# Patient Record
Sex: MALE | Race: WHITE | Employment: FULL TIME | URBAN - METROPOLITAN AREA
[De-identification: names, ages, dates, MRNs, and addresses within clinical notes are randomized per-mention and may not be internally consistent; named-entity substitution may affect disease eponyms.]

---

## 2017-01-18 DIAGNOSIS — G63 GOUTY NEURITIS (H): Primary | ICD-10-CM

## 2017-01-18 DIAGNOSIS — M10.00 GOUTY NEURITIS (H): Primary | ICD-10-CM

## 2017-01-18 RX ORDER — ALLOPURINOL 100 MG/1
TABLET ORAL
Qty: 60 TABLET | Refills: 0 | OUTPATIENT
Start: 2017-01-18

## 2017-01-18 NOTE — TELEPHONE ENCOUNTER
2 jesse refills have already been given, he needs an appointment before meds will be refilled.  Ok to route back to me if he schedules an appt and I'll give him enough to last until that appt  Anne-Marie Alexander PA-C

## 2017-01-18 NOTE — Clinical Note
[facility]  [address]  [facility] [address]  [phone]          Tyson Skinner  [address]  [facility] [address]          01/19/2017      Dear Tyson Skinner        At [facility] we care about your health and are committed to providing quality patient care. Regular appointments are a vital part of the care and management of your health and can help prevent many of the complications that can occur.      It has come to our attention, after 2 jesse refills, that you are due for an office visit in order to process another refill request. Please call [facility] at [phone] soon to schedule your appointment.    If you have transferred care to another clinic please call to inform us so that we do not continue to send you reminder letters.      Sincerely,      [facility] Care Team

## 2017-01-18 NOTE — TELEPHONE ENCOUNTER
allopurinol (ZYLOPRIM) 100 MG tablet       Last Written Prescription Date: 12/07/16  Last Fill Quantity: 60, # refills: 0  Last Office Visit with G, P or [facility] prescribing provider:  02/17/16        URIC ACID   Date Value Ref Range Status   02/17/2016 7.8* 3.5 - 7.2 mg/dL Final   ]  CREATININE   Date Value Ref Range Status   02/17/2016 1.02 0.66 - 1.25 mg/dL Final   ]  WBC      8.4   7/9/2014  RBC     5.21   7/9/2014  HGB     15.6   7/9/2014  HCT     46.0   7/9/2014  No components found with this name: mct  MCV       88   7/9/2014  MCH     29.9   7/9/2014  MCHC     33.9   7/9/2014  RDW     14.1   7/9/2014  PLT      287   7/9/2014  AST       17   2/17/2016  ALT       31   2/17/2016      [facility]

## 2017-01-18 NOTE — TELEPHONE ENCOUNTER
Routing refill request to provider for review/approval because:  Labs out of range:  Uric Acid    Nereida Geiger RN

## 2017-01-19 NOTE — TELEPHONE ENCOUNTER
Called and left a voicemail message and sent a letter  That the provider is unable to refill med and that an   appointment is needed.  Liudmila Guan MA/  For [facility]

## 2017-01-27 ENCOUNTER — OFFICE VISIT (OUTPATIENT)
Dept: FAMILY MEDICINE | Facility: CLINIC | Age: 52
End: 2017-01-27
Payer: COMMERCIAL

## 2017-01-27 VITALS
TEMPERATURE: 97.7 F | BODY MASS INDEX: 27.89 KG/M2 | DIASTOLIC BLOOD PRESSURE: 88 MMHG | HEIGHT: 68 IN | OXYGEN SATURATION: 98 % | WEIGHT: 184 LBS | HEART RATE: 91 BPM | SYSTOLIC BLOOD PRESSURE: 135 MMHG

## 2017-01-27 DIAGNOSIS — B00.1 COLD SORE: ICD-10-CM

## 2017-01-27 DIAGNOSIS — M10.9 GOUT, UNSPECIFIED: Primary | ICD-10-CM

## 2017-01-27 LAB
ALBUMIN SERPL-MCNC: 4.4 G/DL (ref 3.4–5)
ALP SERPL-CCNC: 65 U/L (ref 40–150)
ALT SERPL W P-5'-P-CCNC: 32 U/L (ref 0–70)
ANION GAP SERPL CALCULATED.3IONS-SCNC: 8 MMOL/L (ref 3–14)
AST SERPL W P-5'-P-CCNC: 16 U/L (ref 0–45)
BILIRUB SERPL-MCNC: 1.2 MG/DL (ref 0.2–1.3)
BUN SERPL-MCNC: 14 MG/DL (ref 7–30)
CALCIUM SERPL-MCNC: 9.1 MG/DL (ref 8.5–10.1)
CHLORIDE SERPL-SCNC: 106 MMOL/L (ref 94–109)
CO2 SERPL-SCNC: 26 MMOL/L (ref 20–32)
CREAT SERPL-MCNC: 0.98 MG/DL (ref 0.66–1.25)
GFR SERPL CREATININE-BSD FRML MDRD: 80 ML/MIN/1.7M2
GLUCOSE SERPL-MCNC: 94 MG/DL (ref 70–99)
POTASSIUM SERPL-SCNC: 3.9 MMOL/L (ref 3.4–5.3)
PROT SERPL-MCNC: 7.7 G/DL (ref 6.8–8.8)
SODIUM SERPL-SCNC: 140 MMOL/L (ref 133–144)

## 2017-01-27 PROCEDURE — 99214 OFFICE O/P EST MOD 30 MIN: CPT | Performed by: FAMILY MEDICINE

## 2017-01-27 PROCEDURE — 80053 COMPREHEN METABOLIC PANEL: CPT | Performed by: FAMILY MEDICINE

## 2017-01-27 PROCEDURE — 36415 COLL VENOUS BLD VENIPUNCTURE: CPT | Performed by: FAMILY MEDICINE

## 2017-01-27 RX ORDER — ALLOPURINOL 100 MG/1
TABLET ORAL
Qty: 180 TABLET | Refills: 3 | Status: SHIPPED | OUTPATIENT
Start: 2017-01-27 | End: 2018-04-26

## 2017-01-27 RX ORDER — INDOMETHACIN 25 MG/1
CAPSULE ORAL
Qty: 40 CAPSULE | Refills: 3 | Status: SHIPPED | OUTPATIENT
Start: 2017-01-27 | End: 2018-02-09

## 2017-01-27 RX ORDER — VALACYCLOVIR HYDROCHLORIDE 1 G/1
2000 TABLET, FILM COATED ORAL 2 TIMES DAILY
Qty: 12 TABLET | Refills: 3 | Status: SHIPPED | OUTPATIENT
Start: 2017-01-27 | End: 2018-01-31

## 2017-01-27 ASSESSMENT — PAIN SCALES - GENERAL: PAINLEVEL: NO PAIN (0)

## 2017-01-27 NOTE — NURSING NOTE
"Chief Complaint   Patient presents with     Recheck Medication     refill gout medications       Initial /88 mmHg  Pulse 91  Temp(Src) 97.7  F (36.5  C) (Oral)  Ht 5' 8.25\" (1.734 m)  Wt 184 lb (83.462 kg)  BMI 27.76 kg/m2  SpO2 98% Estimated body mass index is 27.76 kg/(m^2) as calculated from the following:    Height as of this encounter: 5' 8.25\" (1.734 m).    Weight as of this encounter: 184 lb (83.462 kg).  BP completed using cuff size: trae Padron MA      "

## 2017-01-27 NOTE — Clinical Note
[address] [address]  [phone]             January 30, 2017    Tyson Skinner  [address] [facility] [address]            Dear Tyson,    Your kidney, liver and electrolyte tests were normal for you. Enclosed are the results.  Results for orders placed or performed in visit on 01/27/17   Comprehensive metabolic panel (BMP + Alb, Alk Phos, ALT, AST, Total. Bili, TP)   Result Value Ref Range    Sodium 140 133 - 144 mmol/L    Potassium 3.9 3.4 - 5.3 mmol/L    Chloride 106 94 - 109 mmol/L    Carbon Dioxide 26 20 - 32 mmol/L    Anion Gap 8 3 - 14 mmol/L    Glucose 94 70 - 99 mg/dL    Urea Nitrogen 14 7 - 30 mg/dL    Creatinine 0.98 0.66 - 1.25 mg/dL    GFR Estimate 80 >60 mL/min/1.7m2    GFR Estimate If Black >90   GFR Calc   >60 mL/min/1.7m2    Calcium 9.1 8.5 - 10.1 mg/dL    Bilirubin Total 1.2 0.2 - 1.3 mg/dL    Albumin 4.4 3.4 - 5.0 g/dL    Protein Total 7.7 6.8 - 8.8 g/dL    Alkaline Phosphatase 65 40 - 150 U/L    ALT 32 0 - 70 U/L    AST 16 0 - 45 U/L       Sincerely,  Gustabo Au MD/tej

## 2017-01-27 NOTE — MR AVS SNAPSHOT
After Visit Summary   1/27/2017    Tyson Skinner    MRN: [id number]           Patient Information     Date Of Birth          1965        Visit Information        Provider Department      1/27/2017 11:00 AM Gustabo Au MD [facility]        Today's Diagnoses     Gout, unspecified    -  1     Cold sore           Care Instructions    How to contact your care team providers:    [facility]  [phone]  Pharmacy [phone]    MANUELA Espinoza Dr., Dr., Dr., PA-C    Team RN: Ryan DAHL and Veronica DAVIDSON    Clinic hours  M-Th 7am-7pm   Fri 7am-5pm.   Urgent care M-F 11am-9pm,   Sat/sun 9am-5pm.  Pharmacy M-F 8:00am-8pm Sat/sun 9am-5pm.     All password changes, disabled accounts, or ID changes in [employer]/MyHealth will be done by our Access Services Department.   If you need help with your account or password, call: [phone]. Clinic staff no longer has the ability to change passwords.                       Follow-ups after your visit        Who to contact     If you have questions or need follow up information about today's clinic visit or your schedule please contact [facility] directly at [phone].  Normal or non-critical lab and imaging results will be communicated to you by RevTrax[employer], letter or phone within 4 business days after the clinic has received the results. If you do not hear from us within 7 days, please contact the clinic through [employer] or phone. If you have a critical or abnormal lab result, we will notify you by phone as soon as possible.  Submit refill requests through auctionpoint or call your pharmacy and they will forward the refill request to us. Please allow 3 business days for your refill to be completed.          Additional Information About Your Visit        RevTraxhart Information     auctionpoint lets you send messages to your doctor, view your test  "results, renew your prescriptions, schedule appointments and more. To sign up, go to [email or web address] . Click on \"Log in\" on the left side of the screen, which will take you to the Welcome page. Then click on \"Sign up Now\" on the right side of the page.     You will be asked to enter the access code listed below, as well as some personal information. Please follow the directions to create your username and password.     Your access code is: [email or web address]  Expires: 2017 11:11 AM     Your access code will  in 90 days. If you need help or a new code, please call your [facility] clinic or [phone].        Care EveryWhere ID     This is your Care EveryWhere ID. This could be used by other organizations to access your [facility] medical records  [id number]        Your Vitals Were     Pulse Temperature Height BMI (Body Mass Index) Pulse Oximetry       91 97.7  F (36.5  C) (Oral) 5' 8.25\" (1.734 m) 27.76 kg/m2 98%        Blood Pressure from Last 3 Encounters:   17 135/88   16 130/96   10/29/14 108/77    Weight from Last 3 Encounters:   17 184 lb (83.462 kg)   16 194 lb 8 oz (88.225 kg)   10/22/14 190 lb (86.183 kg)              We Performed the Following     Comprehensive metabolic panel (BMP + Alb, Alk Phos, ALT, AST, Total. Bili, TP)          Today's Medication Changes          These changes are accurate as of: 17 11:11 AM.  If you have any questions, ask your nurse or doctor.               These medicines have changed or have updated prescriptions.        Dose/Directions    allopurinol 100 MG tablet   Commonly known as:  ZYLOPRIM   This may have changed:  See the new instructions.   Used for:  Gout, unspecified   Changed by:  Gustabo Au MD        Take 2 tablets by mouth daily for gout prevention.   Quantity:  180 tablet   Refills:  3       valACYclovir 1000 mg tablet   Commonly known as:  VALTREX   This may have changed:  additional instructions   Used for:  Cold sore "   Changed by:  Gustabo Au MD        Dose:  2000 mg   Take 2 tablets (2,000 mg) by mouth 2 times daily For 1 day as needed for flares.   Quantity:  12 tablet   Refills:  3            Where to get your medicines      These medications were sent to [id number] - [place], [place] - [address]  [address] [facility] [place] [address]     Phone:  [phone]    - allopurinol 100 MG tablet  - indomethacin 25 MG capsule  - valACYclovir 1000 mg tablet             Primary Care Provider    Physician No Ref-Primary       No address on file        Thank you!     Thank you for choosing [facility]  for your care. Our goal is always to provide you with excellent care. Hearing back from our patients is one way we can continue to improve our services. Please take a few minutes to complete the written survey that you may receive in the mail after your visit with us. Thank you!             Your Updated Medication List - Protect others around you: Learn how to safely use, store and throw away your medicines at www.disposemymeds.org.          This list is accurate as of: 1/27/17 11:11 AM.  Always use your most recent med list.                   Brand Name Dispense Instructions for use    allopurinol 100 MG tablet    ZYLOPRIM    180 tablet    Take 2 tablets by mouth daily for gout prevention.       IBUPROFEN PO          indomethacin 25 MG capsule    INDOCIN    40 capsule    TAKE 1 CAPSULE (25 MG) BY MOUTH 3 TIMES DAILY WITH MEALS AS NEEDED FOR GOUT PAIN       valACYclovir 1000 mg tablet    VALTREX    12 tablet    Take 2 tablets (2,000 mg) by mouth 2 times daily For 1 day as needed for flares.

## 2017-01-27 NOTE — PROGRESS NOTES
"  SUBJECTIVE:                                                    Tyson Skinner is a 51 year old male who presents to clinic today for the following health issues:    Medication Followup of indomethacin, allopurinol    Taking Medication as prescribed: yes    Side Effects:  None    Medication Helping Symptoms:  yes     Out of medication for 2 weeks. No pain. Last flare over a year ago.    Problem list and histories reviewed & adjusted, as indicated.  Additional history: as documented    Problem list, Medication list, Allergies, and Medical/Social/Surgical histories reviewed in EPIC and updated as appropriate.    ROS:  Constitutional, HEENT, cardiovascular, pulmonary, GI, , musculoskeletal, neuro, skin, endocrine and psych systems are negative, except as otherwise noted.    OBJECTIVE:                                                    /88 mmHg  Pulse 91  Temp(Src) 97.7  F (36.5  C) (Oral)  Ht 5' 8.25\" (1.734 m)  Wt 184 lb (83.462 kg)  BMI 27.76 kg/m2  SpO2 98%  Body mass index is 27.76 kg/(m^2).  GENERAL: healthy, alert and no distress  NECK: no adenopathy, no asymmetry, masses, or scars and thyroid normal to palpation  RESP: lungs clear to auscultation - no rales, rhonchi or wheezes  CV: regular rate and rhythm, normal S1 S2, no S3 or S4, no murmur, click or rub, no peripheral edema and peripheral pulses strong  ABDOMEN: soft, nontender, no hepatosplenomegaly, no masses and bowel sounds normal  MS: no gross musculoskeletal defects noted, no edema    Diagnostic Test Results:  none      ASSESSMENT/PLAN:                                                      1. Gout, unspecified  Controlled. rxs refilled. Monitor renal and hepatic function. Uric acid likely elevated since off allopurinol. Recheck in 1 year while on allopurinol.  - allopurinol (ZYLOPRIM) 100 MG tablet; Take 2 tablets by mouth daily for gout prevention.  Dispense: 180 tablet; Refill: 3  - indomethacin (INDOCIN) 25 MG capsule; TAKE 1 CAPSULE " (25 MG) BY MOUTH 3 TIMES DAILY WITH MEALS AS NEEDED FOR GOUT PAIN  Dispense: 40 capsule; Refill: 3  - Comprehensive metabolic panel (BMP + Alb, Alk Phos, ALT, AST, Total. Bili, TP)    2. Cold sore  Needed refills to use as needed.  - valACYclovir (VALTREX) 1000 mg tablet; Take 2 tablets (2,000 mg) by mouth 2 times daily For 1 day as needed for flares.  Dispense: 12 tablet; Refill: 3    See Patient Instructions    Gustabo Au MD, MD  [facility]

## 2017-01-27 NOTE — PATIENT INSTRUCTIONS
How to contact your care team providers:    [facility]  [phone]  Pharmacy [phone]    MANUELA Espinoza Dr., Dr., Dr., PA-C    Team RN: Ryan DAVIDSON    Clinic hours  M-Th 7am-7pm   Fri 7am-5pm.   Urgent care M-F 11am-9pm,   Sat/sun 9am-5pm.  Pharmacy M-F 8:00am-8pm Sat/sun 9am-5pm.     All password changes, disabled accounts, or ID changes in [employer]/MyHealth will be done by our Access Services Department.   If you need help with your account or password, call: [phone]. Clinic staff no longer has the ability to change passwords.

## 2017-01-30 NOTE — PROGRESS NOTES
Quick Note:    Letter sent to patients home address with results.  Nahid Bullock,  For [facility]   ______

## 2018-01-31 DIAGNOSIS — B00.1 COLD SORE: ICD-10-CM

## 2018-01-31 NOTE — TELEPHONE ENCOUNTER
"Requested Prescriptions   Pending Prescriptions Disp Refills     valACYclovir (VALTREX) 1000 mg tablet [Pharmacy Med Name: ValACYclovir HCl Oral Tablet 1 GM]    Last Written Prescription Date:  1/27/17  Last Fill Quantity: 12,  # refills: 3   Last Office Visit with G, P or [facility] prescribing provider:  1/27/17   Future Office Visit:      12 tablet 2     Sig: TAKE TWO TABLETS BY MOUTH TWICE DAILY FOR 1 DAY AS NEEDED FOR FLARES    Antivirals for Herpes Protocol Failed    1/31/2018  7:01 AM       Failed - Recent or future visit with authorizing provider's specialty    Patient had office visit in the last year or has a visit in the next 30 days with authorizing provider.  See \"Patient Info\" tab in inbasket, or \"Choose Columns\" in Meds & Orders section of the refill encounter.            Failed - Normal serum creatinine on file in past 12 months    Recent Labs   Lab Test  01/27/17   1123   CR  0.98            Passed - Patient is age 12 or older              Freedom [facility]  "

## 2018-02-05 RX ORDER — VALACYCLOVIR HYDROCHLORIDE 1 G/1
TABLET, FILM COATED ORAL
Qty: 12 TABLET | Refills: 2 | Status: SHIPPED | OUTPATIENT
Start: 2018-02-05

## 2018-02-09 DIAGNOSIS — M10.9 GOUT, UNSPECIFIED CAUSE, UNSPECIFIED CHRONICITY, UNSPECIFIED SITE: ICD-10-CM

## 2018-02-09 NOTE — TELEPHONE ENCOUNTER
"Requested Prescriptions   Pending Prescriptions Disp Refills     indomethacin (INDOCIN) 25 MG capsule [Pharmacy Med Name: Indomethacin Oral Capsule 25 MG]  Last Written Prescription Date:  01/27/17  Last Fill Quantity: 40,  # refills: 3   Last Office Visit with [facility], [facility] or [facility] prescribing provider:  01/27/17   Future Office Visit:    40 capsule 2     Sig: take 1 capsule by mouth 3 times per day with meals as needed for gout pain    Gout Agents Protocol Failed    2/9/2018  7:02 AM       Failed - CBC on file in past 12 months    Recent Labs   Lab Test  07/09/14   1046   WBC  8.4   RBC  5.21   HGB  15.6   HCT  46.0   PLT  287            Failed - ALT on file in past 12 months    Recent Labs   Lab Test  01/27/17   1123   ALT  32            Failed - Uric acid greater than or equal to 6 on file in past 12 months    Recent Labs   Lab Test  02/17/16   0849   URIC  7.8*     If level is 6mg/dL or greater, ok to refill one time and refer to provider.          Failed - Recent or future visit with authorizing provider's specialty    Patient had office visit in the last year or has a visit in the next 30 days with authorizing provider.  See \"Patient Info\" tab in inbasket, or \"Choose Columns\" in Meds & Orders section of the refill encounter.            Failed - Normal serum creatinine on file in the past 12 months    Recent Labs   Lab Test  01/27/17   1123   CR  0.98            Passed - Patient is age 18 or older          "

## 2018-02-14 RX ORDER — INDOMETHACIN 25 MG/1
CAPSULE ORAL
Qty: 40 CAPSULE | Refills: 2 | Status: SHIPPED | OUTPATIENT
Start: 2018-02-14

## 2018-04-26 DIAGNOSIS — M1A.9XX0 CHRONIC GOUT WITHOUT TOPHUS, UNSPECIFIED CAUSE, UNSPECIFIED SITE: ICD-10-CM

## 2018-04-26 NOTE — TELEPHONE ENCOUNTER
Rx sent with refills, should have refills:    allopurinol (ZYLOPRIM) 100 MG tablet 180 tablet 3 1/27/2017

## 2018-04-27 RX ORDER — ALLOPURINOL 100 MG/1
TABLET ORAL
Qty: 180 TABLET | Refills: 0 | Status: SHIPPED | OUTPATIENT
Start: 2018-04-27 | End: 2018-10-20

## 2018-04-27 NOTE — TELEPHONE ENCOUNTER
"Routing refill request to provider for review/approval because:  Labs not current:  CBC, ALT, Uric Acid, Creat  Patient needs to be seen because it has been more than 1 year since last office visit.    Felisha Borrego RN        Requested Prescriptions   Pending Prescriptions Disp Refills     allopurinol (ZYLOPRIM) 100 MG tablet [Pharmacy Med Name: Allopurinol Oral Tablet 100 MG] 180 tablet 2     Sig: TAKE TWO TABLETS BY MOUTH DAILY for gout prevention    Gout Agents Protocol Failed    4/26/2018  8:54 AM       Failed - CBC on file in past 12 months    Recent Labs   Lab Test  07/09/14   1046   WBC  8.4   RBC  5.21   HGB  15.6   HCT  46.0   PLT  287            Failed - ALT on file in past 12 months    Recent Labs   Lab Test  01/27/17   1123   ALT  32            Failed - Uric acid greater than or equal to 6 on file in past 12 months    Recent Labs   Lab Test  02/17/16   0849   URIC  7.8*     If level is 6mg/dL or greater, ok to refill one time and refer to provider.          Failed - Recent (12 mo) or future (30 days) visit within the authorizing provider's specialty    Patient had office visit in the last 12 months or has a visit in the next 30 days with authorizing provider or within the authorizing provider's specialty.  See \"Patient Info\" tab in inbasket, or \"Choose Columns\" in Meds & Orders section of the refill encounter.           Failed - Normal serum creatinine on file in the past 12 months    Recent Labs   Lab Test  01/27/17   1123   CR  0.98            Passed - Patient is age 18 or older          "

## 2018-10-20 DIAGNOSIS — M1A.9XX0 CHRONIC GOUT WITHOUT TOPHUS, UNSPECIFIED CAUSE, UNSPECIFIED SITE: ICD-10-CM

## 2018-10-23 NOTE — TELEPHONE ENCOUNTER
Routing refill request to provider for review/approval because:  Labs not current: all  A break in medication  Patient needs to be seen because it has been more than 1 year since last office visit. - do you want staff to contact patient to schedule?  Veronica Davison RN

## 2018-10-24 RX ORDER — ALLOPURINOL 100 MG/1
200 TABLET ORAL DAILY
Qty: 180 TABLET | Refills: 0 | Status: SHIPPED | OUTPATIENT
Start: 2018-10-24 | End: 2019-03-21

## 2019-01-08 ENCOUNTER — TELEPHONE (OUTPATIENT)
Dept: FAMILY MEDICINE | Facility: CLINIC | Age: 54
End: 2019-01-08

## 2019-01-08 NOTE — TELEPHONE ENCOUNTER
"Tyson Skinner is a 53 year old male who calls with tight feeling in chest, winded with activity.    NURSING ASSESSMENT:  Description:  Patient has been noticing fatigue and being winded with exertion, even after one flight of stairs. Slight chest tightness and \"sore\"  feeling in mid chest. Has had for a \"few months\" now. Biggest complaint is feeling tired and exhausted all the time, particularly after activity.  Onset/duration:  \"few months\"  Precip. factors:  none  Associated symptoms:  See above. Denies SOB, cough, dizziness or lightheadedness, n/v, tightness radiating to any other parts of body, denies chest pain, profuse sweating, heart palpitations. Does not have any noted cardiac history, no meds, no diabetes, denies smoking, no HTN. Tightness is not affected by taking deep breaths. No asthma history, does have seasonal allergies.  Improves/worsens symptoms:  Nothing tried  Patient doesn't feel in pain, but when tightness or soreness is at it's worst, 5/10  Last exam/Treatment:  1/27/17  Allergies: No Known Allergies    MEDICATIONS: None        NURSING PLAN: Nursing advice to patient Be seen in clinic for evaluation. If worsening or severe symptoms occur, need to go to ER.    RECOMMENDED DISPOSITION:  See in 24 hours - Recommended OV today. Patient unable to come in, is working. 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 Can come in on Friday to office. RN scheduled patient to see Dr. Au at 1:20 pm on Friday, Jan 11th. Patient was informed RN recommended earlier appt than this. Patient understanding. Patient is to call clinic if experiences any worsening of symptoms. Discussed severe symptoms with patient and informed that if occur, needs to go to ER.  Will comply with recommendation: Agrees to be seen, however declining earlier appt than Friday due to work schedule.  If further questions/concerns or if symptoms do not improve, worsen or new symptoms develop, call your PCP or [facility] as soon as possible.      Guideline used:  Telephone Triage Protocols for Nurses, Fifth Edition, Ximena Car RN

## 2019-01-09 ENCOUNTER — OFFICE VISIT (OUTPATIENT)
Dept: FAMILY MEDICINE | Facility: CLINIC | Age: 54
End: 2019-01-09
Payer: COMMERCIAL

## 2019-01-09 ENCOUNTER — ANCILLARY PROCEDURE (OUTPATIENT)
Dept: GENERAL RADIOLOGY | Facility: CLINIC | Age: 54
End: 2019-01-09
Payer: COMMERCIAL

## 2019-01-09 VITALS
WEIGHT: 189 LBS | TEMPERATURE: 98.1 F | SYSTOLIC BLOOD PRESSURE: 145 MMHG | HEIGHT: 68 IN | DIASTOLIC BLOOD PRESSURE: 94 MMHG | BODY MASS INDEX: 28.64 KG/M2 | HEART RATE: 98 BPM

## 2019-01-09 DIAGNOSIS — Z80.42 FAMILY HISTORY OF PROSTATE CANCER: ICD-10-CM

## 2019-01-09 DIAGNOSIS — R42 LIGHTHEADEDNESS: ICD-10-CM

## 2019-01-09 DIAGNOSIS — M1A.9XX0 CHRONIC GOUT WITHOUT TOPHUS, UNSPECIFIED CAUSE, UNSPECIFIED SITE: ICD-10-CM

## 2019-01-09 DIAGNOSIS — R03.0 ELEVATED BLOOD PRESSURE READING WITHOUT DIAGNOSIS OF HYPERTENSION: ICD-10-CM

## 2019-01-09 DIAGNOSIS — Z80.0 FAMILY HISTORY OF COLON CANCER: ICD-10-CM

## 2019-01-09 DIAGNOSIS — R07.89 CHEST TIGHTNESS OR PRESSURE: ICD-10-CM

## 2019-01-09 DIAGNOSIS — R07.89 CHEST TIGHTNESS OR PRESSURE: Primary | ICD-10-CM

## 2019-01-09 LAB
ALBUMIN SERPL-MCNC: 4.4 G/DL (ref 3.4–5)
ALP SERPL-CCNC: 70 U/L (ref 40–150)
ALT SERPL W P-5'-P-CCNC: 48 U/L (ref 0–70)
ANION GAP SERPL CALCULATED.3IONS-SCNC: 8 MMOL/L (ref 3–14)
AST SERPL W P-5'-P-CCNC: 25 U/L (ref 0–45)
BASOPHILS # BLD AUTO: 0.1 10E9/L (ref 0–0.2)
BASOPHILS NFR BLD AUTO: 0.5 %
BILIRUB SERPL-MCNC: 1.1 MG/DL (ref 0.2–1.3)
BUN SERPL-MCNC: 11 MG/DL (ref 7–30)
CALCIUM SERPL-MCNC: 9.3 MG/DL (ref 8.5–10.1)
CHLORIDE SERPL-SCNC: 106 MMOL/L (ref 94–109)
CHOLEST SERPL-MCNC: 191 MG/DL
CO2 SERPL-SCNC: 27 MMOL/L (ref 20–32)
CREAT SERPL-MCNC: 1 MG/DL (ref 0.66–1.25)
CREAT UR-MCNC: 165 MG/DL
DIFFERENTIAL METHOD BLD: NORMAL
EOSINOPHIL # BLD AUTO: 0.1 10E9/L (ref 0–0.7)
EOSINOPHIL NFR BLD AUTO: 1 %
ERYTHROCYTE [DISTWIDTH] IN BLOOD BY AUTOMATED COUNT: 14 % (ref 10–15)
GFR SERPL CREATININE-BSD FRML MDRD: 85 ML/MIN/{1.73_M2}
GLUCOSE SERPL-MCNC: 87 MG/DL (ref 70–99)
HCT VFR BLD AUTO: 46.8 % (ref 40–53)
HDLC SERPL-MCNC: 48 MG/DL
HGB BLD-MCNC: 15.7 G/DL (ref 13.3–17.7)
LDLC SERPL CALC-MCNC: 103 MG/DL
LYMPHOCYTES # BLD AUTO: 4 10E9/L (ref 0.8–5.3)
LYMPHOCYTES NFR BLD AUTO: 40 %
MCH RBC QN AUTO: 29.7 PG (ref 26.5–33)
MCHC RBC AUTO-ENTMCNC: 33.5 G/DL (ref 31.5–36.5)
MCV RBC AUTO: 89 FL (ref 78–100)
MICROALBUMIN UR-MCNC: 53 MG/L
MICROALBUMIN/CREAT UR: 32.36 MG/G CR (ref 0–17)
MONOCYTES # BLD AUTO: 0.6 10E9/L (ref 0–1.3)
MONOCYTES NFR BLD AUTO: 6.1 %
NEUTROPHILS # BLD AUTO: 5.2 10E9/L (ref 1.6–8.3)
NEUTROPHILS NFR BLD AUTO: 52.4 %
NONHDLC SERPL-MCNC: 143 MG/DL
PLATELET # BLD AUTO: 279 10E9/L (ref 150–450)
POTASSIUM SERPL-SCNC: 4.1 MMOL/L (ref 3.4–5.3)
PROT SERPL-MCNC: 8.1 G/DL (ref 6.8–8.8)
PSA SERPL-ACNC: 0.78 UG/L (ref 0–4)
RBC # BLD AUTO: 5.29 10E12/L (ref 4.4–5.9)
SODIUM SERPL-SCNC: 141 MMOL/L (ref 133–144)
TRIGL SERPL-MCNC: 198 MG/DL
TSH SERPL DL<=0.005 MIU/L-ACNC: 1.73 MU/L (ref 0.4–4)
URATE SERPL-MCNC: 7 MG/DL (ref 3.5–7.2)
WBC # BLD AUTO: 10 10E9/L (ref 4–11)

## 2019-01-09 PROCEDURE — 36415 COLL VENOUS BLD VENIPUNCTURE: CPT | Performed by: PHYSICIAN ASSISTANT

## 2019-01-09 PROCEDURE — 82043 UR ALBUMIN QUANTITATIVE: CPT | Performed by: PHYSICIAN ASSISTANT

## 2019-01-09 PROCEDURE — 80061 LIPID PANEL: CPT | Performed by: PHYSICIAN ASSISTANT

## 2019-01-09 PROCEDURE — 85025 COMPLETE CBC W/AUTO DIFF WBC: CPT | Performed by: PHYSICIAN ASSISTANT

## 2019-01-09 PROCEDURE — G0103 PSA SCREENING: HCPCS | Performed by: PHYSICIAN ASSISTANT

## 2019-01-09 PROCEDURE — 93000 ELECTROCARDIOGRAM COMPLETE: CPT | Performed by: PHYSICIAN ASSISTANT

## 2019-01-09 PROCEDURE — 99214 OFFICE O/P EST MOD 30 MIN: CPT | Performed by: PHYSICIAN ASSISTANT

## 2019-01-09 PROCEDURE — 80053 COMPREHEN METABOLIC PANEL: CPT | Performed by: PHYSICIAN ASSISTANT

## 2019-01-09 PROCEDURE — 71046 X-RAY EXAM CHEST 2 VIEWS: CPT

## 2019-01-09 PROCEDURE — 84443 ASSAY THYROID STIM HORMONE: CPT | Performed by: PHYSICIAN ASSISTANT

## 2019-01-09 PROCEDURE — 84550 ASSAY OF BLOOD/URIC ACID: CPT | Performed by: PHYSICIAN ASSISTANT

## 2019-01-09 ASSESSMENT — MIFFLIN-ST. JEOR: SCORE: 1666.8

## 2019-01-09 NOTE — PROGRESS NOTES
SUBJECTIVE:   Tyson Skinner is a 53 year old male who presents to clinic today for the following health issues:      CHEST PRESSURE     Onset: ongoing for months now     Description:   Location:  Right upper chest  Character: heavy  Radiation: No  Duration: constant     Intensity: mild, moderate    Progression of Symptoms:  constant    Accompanying Signs & Symptoms:  Shortness of breath: YES  Sweating: YES- sometimes   : YES- nausea mild  Lightheadedness: YES, nonspecific, no near syncope/syncope  Palpitations: no  Fever/Chills: no   Cough: no   Heartburn: no     History:   Family history of heart disease YES, father had bypass surgery   Tobacco use: no     Precipitating factors:   Worse with exertion: YES  Worse with deep breaths :  no   Related to food: no     Alleviating factors:  N/A       Therapies Tried and outcome: None     even at rest this happens.    Nonsmoker  minimal leve lipids in 2014    Has been a while since BP was check.      NO PE risk factors.              No Known Allergies    Past Medical History:   Diagnosis Date     Cholelithiasis      Gout      Hx of colonic polyp        Patient Active Problem List   Diagnosis     Gout     CARDIOVASCULAR SCREENING; LDL GOAL LESS THAN 160     Overweight (BMI 25.0-29.9)     Cold sore     Family history of prostate cancer     Family history of colon cancer         Current Outpatient Medications on File Prior to Visit:  allopurinol (ZYLOPRIM) 100 MG tablet Take 2 tablets (200 mg) by mouth daily Please follow up in clinic for next refill.   IBUPROFEN PO    indomethacin (INDOCIN) 25 MG capsule take 1 capsule by mouth 3 times per day with meals as needed for gout pain   valACYclovir (VALTREX) 1000 mg tablet TAKE TWO TABLETS BY MOUTH TWICE DAILY FOR 1 DAY AS NEEDED FOR FLARES     No current facility-administered medications on file prior to visit.     Social History     Tobacco Use     Smoking status: Never Smoker     Smokeless tobacco: Never Used   Substance Use  "Topics     Alcohol use: Yes     Alcohol/week: 0.0 oz     Comment: 4-6 drinks weekly (weekends)     Drug use: No       Family History   Problem Relation Age of Onset     Hypertension Maternal Uncle      C.A.D. Father         CABG x 2     C.A.D. Paternal Aunt      Cerebrovascular Disease No family hx of      Diabetes Maternal Uncle         Type 2     Diabetes Paternal Uncle         Type 2     Cancer Father         several     Cancer Paternal Aunt      Prostate Cancer Paternal Grandfather      Prostate Cancer Paternal Uncle        ROS:  General: negative for fever  Resp: JAMES as above  CV: + for chest pain      OBJECTIVE:  BP (!) 145/94 (BP Location: Left arm)   Pulse 98   Temp 98.1  F (36.7  C) (Oral)   Ht 1.727 m (5' 8\")   Wt 85.7 kg (189 lb)   BMI 28.74 kg/m     General:   awake, alert, and cooperative.  NAD.   Head: Normocephalic, atraumatic.  Eyes: Conjunctiva clear,   Heart: Regular rate and rhythm. No murmur.  Lungs: Chest is clear; no wheezes or rales.   Neuro: Alert and oriented - normal speech.    EKG WNL  My independent read of XR is WNL    ASSESSMENT:well appearing, intermediated to high CAD risk but benign eval with atypical and chronic symptoms. Wells PE score is 0.  We did discuss unable to totally r/o by PERC rule given age, but given chronicity and lack of any other risk factors, tachycardia or hypoxia, patient amenable to deferring furghter PE work up at this time.      ICD-10-CM    1. Chest tightness or pressure R07.89 EKG 12-lead complete w/read - Clinics     Comprehensive metabolic panel     Lipid Profile     XR Chest 2 Views     Echocardiogram Exercise Stress     CANCELED: Echocardiogram Exercise Stress   2. Family history of prostate cancer Z80.42 PSA, screen   3. Chronic gout without tophus, unspecified cause, unspecified site [id number] Uric acid   4. Lightheadedness R42 CBC with platelets differential     TSH with free T4 reflex   5. Family history of colon cancer Z80.0    6. Elevated blood " pressure reading without diagnosis of hypertension R03.0 Albumin Random Urine Quantitative with Creat Ratio       PLAN:   Follow up:  Will consider starting ASA/statin pending lipids.  Cardio pending ECHO.  2 weeks here for BP recheck. Elevated blood pressure plan discussed with patient who expressed understanding.      Advised about symptoms which might herald more serious problems.

## 2019-01-09 NOTE — PATIENT INSTRUCTIONS
Patient Education     Uncertain Causes of Chest Pain    Chest pain can happen for a number of reasons. Sometimes the cause can't be determined. If your condition does not seem serious, and your pain does not appear to be coming from your heart, your healthcare provider may recommend watching it closely. Sometimes the signs of a serious problem take more time to appear. Many problems not related to your heart can cause chest pain. These include:    Musculoskeletal. Costochondritis is an inflammation of the tissues around the ribs that can occur from trauma or overuse injuries, or a strain of the muscles of the chest wall    Respiratory. Pneumonia, collapsed lung (pneumothorax), or inflammation of the lining of the chest and lungs (pleurisy)    Gastrointestinal. Esophageal reflux, heartburn, ulcers, or gallbladder disease    Anxiety and panic disorders    Nerve compression and inflammation    Rare miscellaneous problems such as aortic aneurysm (a swelling of the large artery coming out of the heart) or pulmonary embolism (a blood clot in the lungs)  Home care  After your visit, follow these recommendations:    Rest today and avoid strenuous activity.    Take any prescribed medicine as directed.    Be aware of any recurrent chest pain and notice any changes  Follow-up care  Follow up with your healthcare provider if you do not start to feel better within 24 hours, or as advised.  Call 911  Call 911 if any of these occur:    A change in the type of pain: if it feels different, becomes more severe, lasts longer, or begins to spread into your shoulder, arm, neck, jaw or back    Shortness of breath or increased pain with breathing    Weakness, dizziness, or fainting    Rapid heart beat    Crushing sensation in your chest   When to seek medical advice  Call your healthcare provider right away if any of the following occur:    Cough with dark colored sputum (phlegm) or blood    Fever of 100.4 F (38 C) or higher, or as  directed by your healthcare provider    Swelling, pain or redness in one leg  Date Last Reviewed: 5/1/2018 2000-2018 [email or web address], [employer]. [address] [place] [address], [place] [address]. All rights reserved. This information is not intended as a substitute for professional medical care. Always follow your healthcare professional's instructions.

## 2019-01-10 ENCOUNTER — TELEPHONE (OUTPATIENT)
Dept: FAMILY MEDICINE | Facility: CLINIC | Age: 54
End: 2019-01-10

## 2019-01-10 DIAGNOSIS — R07.9 CHEST PAIN, UNSPECIFIED TYPE: ICD-10-CM

## 2019-01-10 DIAGNOSIS — R80.9 MICROALBUMINURIA: Primary | ICD-10-CM

## 2019-01-10 RX ORDER — LISINOPRIL 10 MG/1
10 TABLET ORAL DAILY
Qty: 30 TABLET | Refills: 1 | Status: SHIPPED | OUTPATIENT
Start: 2019-01-10 | End: 2020-01-10

## 2019-01-10 NOTE — TELEPHONE ENCOUNTER
"Provider please clarify intended message: \"hold off on starting aspiring\"    Thank you,  Shameka Ruano RN    "

## 2019-01-10 NOTE — TELEPHONE ENCOUNTER
This writer attempted to contact Tyson on 01/10/19      Reason for call results/recommendations and left message.      If patient calls back:   Patient contacted by a Registered Nurse. Inform patient that someone from the RN group will contact them, document that pt called and route to P DYAD 3 RN POOL [[id number]]        Shameka Ruano RN

## 2019-01-10 NOTE — TELEPHONE ENCOUNTER
I had discussed with patient at visit possibly starting Aspirin pending his labs and I want to wait until we get the echo tomorrow to decide if he should .  Barrie Stearns PA-C

## 2019-01-10 NOTE — TELEPHONE ENCOUNTER
The 10-year ASCVD risk score (Panfilo GOMEZ Jr., et al., 2013) is: 6.8%    Values used to calculate the score:      Age: 53 years      Sex: Male      Is Non- : No      Diabetic: No      Tobacco smoker: No      Systolic Blood Pressure: 145 mmHg      Is BP treated: No      HDL Cholesterol: 48 mg/dL      Total Cholesterol: 191 mg/dL      Please call patient and let him know his labs were basically normal for nonfasting tests except there was some protein in his urine which means his elevated BP is likely real.. I have sent a medicine to the pharmacy to help protect his kidneys and lower his BP.  He should schedule an appt with me in two weeks for a recheck.  As it looks like he has his echocardiogam tomorrow, we will hold off on starting Aspiring until we get those results as his AHA risk is low enough to defer for now.      Barrie Stearns PA-C

## 2019-01-11 ENCOUNTER — ANCILLARY PROCEDURE (OUTPATIENT)
Dept: CARDIOLOGY | Facility: CLINIC | Age: 54
End: 2019-01-11

## 2019-01-11 DIAGNOSIS — R07.89 CHEST TIGHTNESS OR PRESSURE: ICD-10-CM

## 2019-01-11 RX ADMIN — Medication 5 ML: at 14:02

## 2019-01-13 NOTE — TELEPHONE ENCOUNTER
When patient calls back, let him know his stress echo was normal.  Given this and that everything else is normal,I tried to add on the blood test for the blood clot that we had discussed at his visit but was not able to do so.  He should come in for a lab visit to get this checked or sched a follow up with me this week to discuss further.    Barrie Stearns PA-C

## 2019-01-16 ENCOUNTER — OFFICE VISIT (OUTPATIENT)
Dept: FAMILY MEDICINE | Facility: CLINIC | Age: 54
End: 2019-01-16
Payer: COMMERCIAL

## 2019-01-16 VITALS
DIASTOLIC BLOOD PRESSURE: 87 MMHG | SYSTOLIC BLOOD PRESSURE: 137 MMHG | HEIGHT: 68 IN | RESPIRATION RATE: 16 BRPM | OXYGEN SATURATION: 97 % | TEMPERATURE: 98.7 F | HEART RATE: 92 BPM | WEIGHT: 192.3 LBS | BODY MASS INDEX: 29.15 KG/M2

## 2019-01-16 DIAGNOSIS — R53.83 FATIGUE, UNSPECIFIED TYPE: ICD-10-CM

## 2019-01-16 DIAGNOSIS — R06.00 DYSPNEA, UNSPECIFIED TYPE: Primary | ICD-10-CM

## 2019-01-16 DIAGNOSIS — R06.83 SNORING: ICD-10-CM

## 2019-01-16 LAB — D DIMER PPP FEU-MCNC: <0.3 UG/ML FEU (ref 0–0.5)

## 2019-01-16 PROCEDURE — 85379 FIBRIN DEGRADATION QUANT: CPT | Performed by: PHYSICIAN ASSISTANT

## 2019-01-16 PROCEDURE — 84270 ASSAY OF SEX HORMONE GLOBUL: CPT | Performed by: PHYSICIAN ASSISTANT

## 2019-01-16 PROCEDURE — 84403 ASSAY OF TOTAL TESTOSTERONE: CPT | Performed by: PHYSICIAN ASSISTANT

## 2019-01-16 PROCEDURE — 86618 LYME DISEASE ANTIBODY: CPT | Performed by: PHYSICIAN ASSISTANT

## 2019-01-16 PROCEDURE — 99214 OFFICE O/P EST MOD 30 MIN: CPT | Performed by: PHYSICIAN ASSISTANT

## 2019-01-16 PROCEDURE — 36415 COLL VENOUS BLD VENIPUNCTURE: CPT | Performed by: PHYSICIAN ASSISTANT

## 2019-01-16 ASSESSMENT — PAIN SCALES - GENERAL: PAINLEVEL: NO PAIN (0)

## 2019-01-16 ASSESSMENT — MIFFLIN-ST. JEOR: SCORE: 1681.77

## 2019-01-16 NOTE — LETTER
January 23, 2019      Tyson Skinner  [address] [place] [address]        Dear Tyson Skinner    All of your labs were basically normal.  Your testosterone was a little low but since the lab was taken in the evening, when testosterone is usually lower, this is probably normal.  We could confirm this with a test first thing in the morning if you wanted but it is up to you.     Please contact the clinic if you have additional questions or if symptoms persist.  Thank you.      Enclosed is a copy of the results.  It was a pleasure to see you at your last appointment.      Sincerely,      Barrie Stearns PA-C/N. KARL Barone      Results for orders placed or performed in visit on 01/16/19   **Testosterone Free and Total FUTURE anytime   Result Value Ref Range    Testosterone Total 204 (L) 240 - 950 ng/dL    Sex Hormone Binding Globulin 15 11 - 80 nmol/L    Free Testosterone Calculated 5.67 4.7 - 24.4 ng/dL   D dimer, quantitative   Result Value Ref Range    D Dimer <0.3 0.0 - 0.50 ug/ml FEU   Lyme Disease Joyce with reflex to WB Serum   Result Value Ref Range    Lyme Disease Antibodies Serum 0.06 0.00 - 0.89

## 2019-01-17 LAB — B BURGDOR IGG+IGM SER QL: 0.06 (ref 0–0.89)

## 2019-01-17 NOTE — PROGRESS NOTES
SUBJECTIVE:   Tyson Skinner is a 53 year old male who presents to clinic today for the following health issues:      Concern - SOB, chest tightness, easily fatigued, does snore, no witnessed apneas, results, lab draw  Onset: 3 months    Description:   Constant, noticed during walking, stairs - winded    Intensity: mild, moderate    Progression of Symptoms:  worsening    Accompanying Signs & Symptoms:  Lightheaded - occasional    Previous history of similar problem:   None    Precipitating factors:   Worsened by: activity (doesn't have to be a lot)    Alleviating factors:  Improved by: rest    Therapies Tried and outcome: rest     Stress echo was normal, nonfasting liupids a little elev, urine microalb elev and BP a little high, Lisinipril ordered but not started.  No tick bites or rashes, hasn't been exercising as much as he used to.           No Known Allergies    Past Medical History:   Diagnosis Date     Cholelithiasis      Gout      Hx of colonic polyp        Patient Active Problem List   Diagnosis     Gout     CARDIOVASCULAR SCREENING; LDL GOAL LESS THAN 160     Overweight (BMI 25.0-29.9)     Cold sore     Family history of prostate cancer     Family history of colon cancer     Microalbuminuria         Current Outpatient Medications on File Prior to Visit:  allopurinol (ZYLOPRIM) 100 MG tablet Take 2 tablets (200 mg) by mouth daily Please follow up in clinic for next refill.   IBUPROFEN PO    indomethacin (INDOCIN) 25 MG capsule take 1 capsule by mouth 3 times per day with meals as needed for gout pain   lisinopril (PRINIVIL/ZESTRIL) 10 MG tablet Take 1 tablet (10 mg) by mouth daily   valACYclovir (VALTREX) 1000 mg tablet TAKE TWO TABLETS BY MOUTH TWICE DAILY FOR 1 DAY AS NEEDED FOR FLARES     No current facility-administered medications on file prior to visit.     Social History     Tobacco Use     Smoking status: Never Smoker     Smokeless tobacco: Never Used   Substance Use Topics     Alcohol use: Yes     " Alcohol/week: 0.0 oz     Comment: 4-6 drinks weekly (weekends)     Drug use: No       Family History   Problem Relation Age of Onset     Hypertension Maternal Uncle      C.A.D. Father         CABG x 2     C.A.D. Paternal Aunt      Cerebrovascular Disease No family hx of      Diabetes Maternal Uncle         Type 2     Diabetes Paternal Uncle         Type 2     Cancer Father         several     Cancer Paternal Aunt      Prostate Cancer Paternal Grandfather      Prostate Cancer Paternal Uncle        ROS:  General: negative for fever  Resp: negative for chest pain   CV: negative for chest pain  GI: Negative for abd pain.  Neurologic:negative for headache    OBJECTIVE:  /87 (BP Location: Left arm, Patient Position: Sitting, Cuff Size: Adult Regular)   Pulse 92   Temp 98.7  F (37.1  C) (Oral)   Resp 16   Ht 1.727 m (5' 8\")   Wt 87.2 kg (192 lb 4.8 oz)   SpO2 97%   BMI 29.24 kg/m     General:   awake, alert, and cooperative.  NAD.   Head: Normocephalic, atraumatic.  Eyes: Conjunctiva clear,   Heart: Regular rate and rhythm. No murmur.  Lungs: Chest is clear; no wheezes or rales.   Neuro: Alert and oriented - normal speech.    ASSESSMENT:thsi could simply be deconditioning (for the JAMES) + untreated apnea (for the fatigue) but given the relatively recent change, symptoms, age, and lack of other etiology, will need to r/o a PE.      ICD-10-CM    1. Dyspnea, unspecified type R06.00 D dimer, quantitative   2. Fatigue, unspecified type R53.83 **Testosterone Free and Total FUTURE anytime     Lyme Disease Joyce with reflex to WB Serum   3. Snoring R06.83 SLEEP EVALUATION & MANAGEMENT REFERRAL - [facility]  [phone] (Age 15 and up)       PLAN:   Follow up:  As needed, pending labs  Advised about symptoms which might herald more serious problems.            "

## 2019-01-17 NOTE — PATIENT INSTRUCTIONS
Patient Education     Shortness of Breath (Dyspnea)  Shortness of breath is the feeling that you can't catch your breath or get enough air. It is also known as dyspnea.  Dyspnea can be caused by many different conditions. They include:    Acute asthma attack    Worsening of chronic lung diseases such as chronic bronchitis and emphysema    Heart failure. This is when weak heart muscle allows extra fluid to collect in the lungs.    Panic attacks or anxiety. Fear can cause rapid breathing (hyperventilation).    Pneumonia, or an infection in the lung tissue    Exposure to toxic substances, fumes, smoke, or certain medicines    Blood clot in the lung (pulmonary embolism). This is often from a piece of blood clot in a deep vein of the leg (deep vein thrombosis) that breaks off and travels to the lungs.    Heart attack or heart-related chest pain (angina)    Anemia    Collapsed lung (pneumothorax)    Dehydration    Pregnancy  Based on your visit today, the exact cause of your shortness of breath is not certain. Your tests don t show any of the serious causes of dyspnea. You may need other tests to find out if you have a serious problem. It s important to watch for any new symptoms or symptoms that get worse. Follow up with your healthcare provider as directed.  Home care  Follow these tips to take care of yourself at home:    When your symptoms are better, go back to your usual activities.    If you smoke, you should stop. Join a quit-smoking program or ask your healthcare provider for help.    Eat a healthy diet and get plenty of sleep.    Get regular exercise. Talk with your healthcare provider before starting to exercise, especially if you have other medical problems.    Cut down on the amount of caffeine and stimulants you consume.  Follow-up care  Follow up with your healthcare provider, or as advised.  If tests were done, you will be told if your treatment needs to be changed. You can call as directed for the  results.  If an X-ray was taken, a specialist will review it. You will be notified of any new findings that may affect your care.  Call 911  Shortness of breath may be a sign of a serious medical problem. For example, it may be a problem with your heart or lungs. Call 911 if you have worsening shortness of breath or trouble breathing, especially with any of the symptoms below:    Confusion or difficulty waking    Fainting or loss of consciousness.    Fast or irregular heartbeat    Coughing up blood    Pain in your chest, arm, shoulder, neck, or upper back    Sweating  When to seek medical advice  Call your healthcare provider right away if any of these occur:    Slight shortness of breath or wheezing    Redness, pain or swelling in your leg, arm, or other body area    Swelling in both legs or ankles    Fast weight gain    Dizziness or weakness    Fever of 100.4 F (38 C) or higher, or as directed by your healthcare provider  Date Last Reviewed: 6/1/2018 2000-2018 [employer]. [address] [place] [address], [place] [address]. All rights reserved. This information is not intended as a substitute for professional medical care. Always follow your healthcare professional's instructions.

## 2019-01-18 LAB
SHBG SERPL-SCNC: 15 NMOL/L (ref 11–80)
TESTOST FREE SERPL-MCNC: 5.67 NG/DL (ref 4.7–24.4)
TESTOST SERPL-MCNC: 204 NG/DL (ref 240–950)

## 2019-01-23 NOTE — RESULT ENCOUNTER NOTE
Please send letter if doesn't check mychart.  Barrie Stearns PA-C    Mr. Skinner,    All of your labs were basically normal.  Your testosterone was a little low but since the lab was taken in the evening, when testosterone is usually lower, this is probably normal.  We could confirm this with a test first thing in the morning if you wanted but it is up to you.    Please contact the clinic if you have additional questions or if symptoms persist.  Thank you.    Sincerely,    Andrew Stearns

## 2019-02-05 DIAGNOSIS — B00.1 COLD SORE: ICD-10-CM

## 2019-02-05 NOTE — TELEPHONE ENCOUNTER
"Requested Prescriptions   Pending Prescriptions Disp Refills     valACYclovir (VALTREX) 1000 mg tablet [Pharmacy Med Name: ValACYclovir HCl Oral Tablet 1 GM] 12 tablet 2      Last Written Prescription Date:  02/05/18  Last Fill Quantity: 12,  # refills: 2   Last Office Visit with FMLISA, RAMON or [facility] prescribing provider:  01/16/19Thuan   Future Office Visit:    Sig: TAKE TWO TABLETS BY MOUTH TWICE DAILY FOR 1 DAY AS NEEDED FOR FLARES    Antivirals for Herpes Protocol Passed - 2/5/2019  8:27 AM       Passed - Patient is age 12 or older       Passed - Recent (12 mo) or future (30 days) visit within the authorizing provider's specialty    Patient had office visit in the last 12 months or has a visit in the next 30 days with authorizing provider or within the authorizing provider's specialty.  See \"Patient Info\" tab in inbasket, or \"Choose Columns\" in Meds & Orders section of the refill encounter.             Passed - Medication is active on med list       Passed - Normal serum creatinine on file in past 12 months    Recent Labs   Lab Test 01/09/19  1346   CR 1.00               "

## 2019-02-07 RX ORDER — VALACYCLOVIR HYDROCHLORIDE 1 G/1
TABLET, FILM COATED ORAL
Qty: 12 TABLET | Refills: 2 | Status: SHIPPED | OUTPATIENT
Start: 2019-02-07

## 2019-02-07 NOTE — TELEPHONE ENCOUNTER
Prescription approved per [facility] Refill Protocol.      Umang Francisco RN, BSN

## 2019-02-22 DIAGNOSIS — M10.9 GOUT, UNSPECIFIED CAUSE, UNSPECIFIED CHRONICITY, UNSPECIFIED SITE: ICD-10-CM

## 2019-02-22 NOTE — TELEPHONE ENCOUNTER
"Requested Prescriptions   Pending Prescriptions Disp Refills     indomethacin (INDOCIN) 25 MG capsule [Pharmacy Med Name: Indomethacin Oral Capsule 25 MG] 40 capsule 2      Last Written Prescription Date:  02/14/18  Last Fill Quantity: 40,  # refills: 2   Last Office Visit with LISA, JAYDEN or [facility] prescribing provider:  1/16/19Thuan   Future Office Visit:    Sig: take 1 capsule by mouth 3 times per day with meals as needed for gout pain    Gout Agents Protocol Failed - 2/22/2019  7:24 AM       Failed - Has Uric Acid on file in past 12 months and value is less than 6    Recent Labs   Lab Test 01/09/19  1346   URIC 7.0     If level is 6mg/dL or greater, ok to refill one time and refer to provider.          Passed - CBC on file in past 12 months    Recent Labs   Lab Test 01/09/19  1346   WBC 10.0   RBC 5.29   HGB 15.7   HCT 46.8                   Passed - ALT on file in past 12 months    Recent Labs   Lab Test 01/09/19  1346   ALT 48            Passed - Recent (12 mo) or future (30 days) visit within the authorizing provider's specialty    Patient had office visit in the last 12 months or has a visit in the next 30 days with authorizing provider or within the authorizing provider's specialty.  See \"Patient Info\" tab in inbasket, or \"Choose Columns\" in Meds & Orders section of the refill encounter.             Passed - Medication is active on med list       Passed - Patient is age 18 or older       Passed - Normal serum creatinine on file in the past 12 months    Recent Labs   Lab Test 01/09/19  1346   CR 1.00               "

## 2019-02-27 RX ORDER — INDOMETHACIN 25 MG/1
CAPSULE ORAL
Qty: 40 CAPSULE | Refills: 2 | Status: SHIPPED | OUTPATIENT
Start: 2019-02-27

## 2019-03-21 DIAGNOSIS — M1A.9XX0 CHRONIC GOUT WITHOUT TOPHUS, UNSPECIFIED CAUSE, UNSPECIFIED SITE: ICD-10-CM

## 2019-03-21 RX ORDER — ALLOPURINOL 100 MG/1
200 TABLET ORAL DAILY
Qty: 180 TABLET | Refills: 0 | Status: SHIPPED | OUTPATIENT
Start: 2019-03-21 | End: 2019-06-28

## 2019-03-21 NOTE — TELEPHONE ENCOUNTER
"Requested Prescriptions   Pending Prescriptions Disp Refills     allopurinol (ZYLOPRIM) 100 MG tablet [Pharmacy Med Name: Allopurinol Oral Tablet 100 MG]  Last Written Prescription Date:  10/24/18  Last Fill Quantity: 180,  # refills: 0   Last office visit: 1/16/2019 with prescribing provider:  Daryn   Future Office Visit:     180 tablet 0     Sig: Take 2 tablets (200 mg) by mouth daily Please follow up in clinic for next refill.    Gout Agents Protocol Failed - 3/21/2019  8:43 AM       Failed - Has Uric Acid on file in past 12 months and value is less than 6    Recent Labs   Lab Test 01/09/19  1346   URIC 7.0     If level is 6mg/dL or greater, ok to refill one time and refer to provider.          Passed - CBC on file in past 12 months    Recent Labs   Lab Test 01/09/19  1346   WBC 10.0   RBC 5.29   HGB 15.7   HCT 46.8                   Passed - ALT on file in past 12 months    Recent Labs   Lab Test 01/09/19  1346   ALT 48            Passed - Recent (12 mo) or future (30 days) visit within the authorizing provider's specialty    Patient had office visit in the last 12 months or has a visit in the next 30 days with authorizing provider or within the authorizing provider's specialty.  See \"Patient Info\" tab in inbasket, or \"Choose Columns\" in Meds & Orders section of the refill encounter.             Passed - Medication is active on med list       Passed - Patient is age 18 or older       Passed - Normal serum creatinine on file in the past 12 months    Recent Labs   Lab Test 01/09/19  1346   CR 1.00               "

## 2019-03-21 NOTE — TELEPHONE ENCOUNTER
Routing refill request to provider for review/approval because:  Labs out of range:  Uric acid    Felisha Borrego RN

## 2019-06-28 DIAGNOSIS — M1A.9XX0 CHRONIC GOUT WITHOUT TOPHUS, UNSPECIFIED CAUSE, UNSPECIFIED SITE: ICD-10-CM

## 2019-06-28 RX ORDER — ALLOPURINOL 100 MG/1
200 TABLET ORAL DAILY
Qty: 60 TABLET | Refills: 0 | Status: SHIPPED | OUTPATIENT
Start: 2019-06-28

## 2019-06-28 NOTE — TELEPHONE ENCOUNTER
Routing refill request to provider for review/approval because:  Elsa given x1 and patient did not follow up, please advise  Shameka Ruano RN

## 2019-06-28 NOTE — TELEPHONE ENCOUNTER
"Requested Prescriptions   Pending Prescriptions Disp Refills     allopurinol (ZYLOPRIM) 100 MG tablet [Pharmacy Med Name: Allopurinol Oral Tablet 100 MG]  Last Written Prescription Date:  03/21/19  Last Fill Quantity: 180,  # refills: 0   Last Office Visit with LISA, JAYDEN or [facility] prescribing provider:  01/16/19Thuan   Future Office Visit:    180 tablet 0     Sig: Take 2 tablets (200 mg) by mouth daily Please follow up in clinic for next refill.       Gout Agents Protocol Failed - 6/28/2019  7:01 AM        Failed - Has Uric Acid on file in past 12 months and value is less than 6     Recent Labs   Lab Test 01/09/19  1346   URIC 7.0     If level is 6mg/dL or greater, ok to refill one time and refer to provider.           Passed - CBC on file in past 12 months     Recent Labs   Lab Test 01/09/19  1346   WBC 10.0   RBC 5.29   HGB 15.7   HCT 46.8                    Passed - ALT on file in past 12 months     Recent Labs   Lab Test 01/09/19  1346   ALT 48             Passed - Recent (12 mo) or future (30 days) visit within the authorizing provider's specialty     Patient had office visit in the last 12 months or has a visit in the next 30 days with authorizing provider or within the authorizing provider's specialty.  See \"Patient Info\" tab in inbasket, or \"Choose Columns\" in Meds & Orders section of the refill encounter.              Passed - Medication is active on med list        Passed - Patient is age 18 or older        Passed - Normal serum creatinine on file in the past 12 months     Recent Labs   Lab Test 01/09/19  1346   CR 1.00               "

## 2019-06-28 NOTE — TELEPHONE ENCOUNTER
Called, patient is notified and will schedule an appointment before medication runs out.  Nahid Bullock,  [employer] [facility]

## 2021-05-30 ENCOUNTER — RECORDS - HEALTHEAST (OUTPATIENT)
Dept: ADMINISTRATIVE | Facility: CLINIC | Age: 56
End: 2021-05-30